# Patient Record
Sex: MALE | Race: AMERICAN INDIAN OR ALASKA NATIVE | Employment: OTHER | URBAN - METROPOLITAN AREA
[De-identification: names, ages, dates, MRNs, and addresses within clinical notes are randomized per-mention and may not be internally consistent; named-entity substitution may affect disease eponyms.]

---

## 2020-01-23 ENCOUNTER — TELEPHONE (OUTPATIENT)
Dept: SURGERY | Facility: CLINIC | Age: 40
End: 2020-01-23

## 2020-01-23 NOTE — TELEPHONE ENCOUNTER
Called and spoke with patient in regards to concerns. Advised patient that we cannot authorize orders since he has not been seen in clinic since 2015 (last with Jerica Bernal). Patient understands and would like to follow up in clinic. Scheduled with Mirtha Alejandra PA-C next Wednesday 1/29/20 at 8:15am.

## 2020-01-23 NOTE — TELEPHONE ENCOUNTER
Cold call, Hunter is calling to schedule an infusion.  He went to [facility] urgent care yesterday as had been experiencing headaches, he had some labs done, reports Hemoglobin result was 8.  He is calling to get infusion, he hasn't needed one for several years.  Best number to contact him at today is

## 2020-01-27 ENCOUNTER — TELEPHONE (OUTPATIENT)
Dept: SURGERY | Facility: CLINIC | Age: 40
End: 2020-01-27

## 2020-01-27 NOTE — TELEPHONE ENCOUNTER
Called to remind patient of appointment, patient had to cancel, will call to reschedule.   Kalani Orta, EMT

## 2020-02-11 ENCOUNTER — TELEPHONE (OUTPATIENT)
Dept: SURGERY | Facility: CLINIC | Age: 40
End: 2020-02-11

## 2020-02-11 NOTE — TELEPHONE ENCOUNTER
Patient had DS weight loss surgery 2009.      Scheduled to see Mirtha Alejandra PA-C at 1015.    Spoke to patient: reguarding appointment on Wednesday February 12th.        Instructed to complete pre-visit questionnaires on [employer], or arrive 20 minutes early to complete.    [phone] contact center phone number.    Kalani Orta, EMT

## 2020-02-12 ENCOUNTER — INFUSION THERAPY VISIT (OUTPATIENT)
Dept: INFUSION THERAPY | Facility: CLINIC | Age: 40
End: 2020-02-12
Payer: COMMERCIAL

## 2020-02-12 ENCOUNTER — OFFICE VISIT (OUTPATIENT)
Dept: SURGERY | Facility: CLINIC | Age: 40
End: 2020-02-12
Payer: COMMERCIAL

## 2020-02-12 VITALS
HEIGHT: 73 IN | DIASTOLIC BLOOD PRESSURE: 78 MMHG | SYSTOLIC BLOOD PRESSURE: 131 MMHG | WEIGHT: 199.6 LBS | BODY MASS INDEX: 26.45 KG/M2 | HEART RATE: 81 BPM | OXYGEN SATURATION: 96 % | TEMPERATURE: 98.4 F

## 2020-02-12 VITALS
TEMPERATURE: 98.4 F | SYSTOLIC BLOOD PRESSURE: 119 MMHG | DIASTOLIC BLOOD PRESSURE: 72 MMHG | HEART RATE: 69 BPM | OXYGEN SATURATION: 96 %

## 2020-02-12 DIAGNOSIS — D50.9 IRON DEFICIENCY ANEMIA, UNSPECIFIED IRON DEFICIENCY ANEMIA TYPE: ICD-10-CM

## 2020-02-12 DIAGNOSIS — Z98.84 BARIATRIC SURGERY STATUS: ICD-10-CM

## 2020-02-12 DIAGNOSIS — D50.9 IRON DEFICIENCY ANEMIA, UNSPECIFIED IRON DEFICIENCY ANEMIA TYPE: Primary | ICD-10-CM

## 2020-02-12 DIAGNOSIS — Z98.84 BARIATRIC SURGERY STATUS: Primary | ICD-10-CM

## 2020-02-12 LAB
ALBUMIN SERPL-MCNC: 3.7 G/DL (ref 3.4–5)
ALP SERPL-CCNC: 73 U/L (ref 40–150)
ALT SERPL W P-5'-P-CCNC: 37 U/L (ref 0–70)
ANION GAP SERPL CALCULATED.3IONS-SCNC: 5 MMOL/L (ref 3–14)
AST SERPL W P-5'-P-CCNC: 18 U/L (ref 0–45)
BILIRUB SERPL-MCNC: 0.3 MG/DL (ref 0.2–1.3)
BUN SERPL-MCNC: 11 MG/DL (ref 7–30)
CALCIUM SERPL-MCNC: 8.3 MG/DL (ref 8.5–10.1)
CHLORIDE SERPL-SCNC: 110 MMOL/L (ref 94–109)
CHOLEST SERPL-MCNC: 115 MG/DL
CO2 SERPL-SCNC: 25 MMOL/L (ref 20–32)
CREAT SERPL-MCNC: 0.59 MG/DL (ref 0.66–1.25)
ERYTHROCYTE [DISTWIDTH] IN BLOOD BY AUTOMATED COUNT: 21.4 % (ref 10–15)
FERRITIN SERPL-MCNC: 3 NG/ML (ref 26–388)
GFR SERPL CREATININE-BSD FRML MDRD: >90 ML/MIN/{1.73_M2}
GLUCOSE SERPL-MCNC: 105 MG/DL (ref 70–99)
HBA1C MFR BLD: NORMAL % (ref 0–5.6)
HCT VFR BLD AUTO: 32.5 % (ref 40–53)
HDLC SERPL-MCNC: 45 MG/DL
HGB BLD-MCNC: 9.4 G/DL (ref 13.3–17.7)
LDLC SERPL CALC-MCNC: 60 MG/DL
MCH RBC QN AUTO: 22.3 PG (ref 26.5–33)
MCHC RBC AUTO-ENTMCNC: 28.9 G/DL (ref 31.5–36.5)
MCV RBC AUTO: 77 FL (ref 78–100)
NONHDLC SERPL-MCNC: 70 MG/DL
PLATELET # BLD AUTO: 315 10E9/L (ref 150–450)
POTASSIUM SERPL-SCNC: 4 MMOL/L (ref 3.4–5.3)
PREALB SERPL IA-MCNC: 23 MG/DL (ref 15–45)
PROT SERPL-MCNC: 7.8 G/DL (ref 6.8–8.8)
PTH-INTACT SERPL-MCNC: 148 PG/ML (ref 18–80)
RBC # BLD AUTO: 4.21 10E12/L (ref 4.4–5.9)
SODIUM SERPL-SCNC: 140 MMOL/L (ref 133–144)
TRIGL SERPL-MCNC: 52 MG/DL
WBC # BLD AUTO: 8.3 10E9/L (ref 4–11)

## 2020-02-12 PROCEDURE — 96365 THER/PROPH/DIAG IV INF INIT: CPT

## 2020-02-12 PROCEDURE — 82728 ASSAY OF FERRITIN: CPT | Performed by: PHYSICIAN ASSISTANT

## 2020-02-12 PROCEDURE — 25000128 H RX IP 250 OP 636: Mod: ZF | Performed by: PHYSICIAN ASSISTANT

## 2020-02-12 PROCEDURE — 84597 ASSAY OF VITAMIN K: CPT | Performed by: PHYSICIAN ASSISTANT

## 2020-02-12 PROCEDURE — 84446 ASSAY OF VITAMIN E: CPT | Performed by: PHYSICIAN ASSISTANT

## 2020-02-12 PROCEDURE — 82525 ASSAY OF COPPER: CPT | Performed by: PHYSICIAN ASSISTANT

## 2020-02-12 PROCEDURE — 25800030 ZZH RX IP 258 OP 636: Mod: ZF | Performed by: PHYSICIAN ASSISTANT

## 2020-02-12 PROCEDURE — 80061 LIPID PANEL: CPT | Performed by: PHYSICIAN ASSISTANT

## 2020-02-12 PROCEDURE — 84134 ASSAY OF PREALBUMIN: CPT | Performed by: PHYSICIAN ASSISTANT

## 2020-02-12 PROCEDURE — 84590 ASSAY OF VITAMIN A: CPT | Performed by: PHYSICIAN ASSISTANT

## 2020-02-12 PROCEDURE — 84630 ASSAY OF ZINC: CPT | Performed by: PHYSICIAN ASSISTANT

## 2020-02-12 PROCEDURE — 36415 COLL VENOUS BLD VENIPUNCTURE: CPT

## 2020-02-12 PROCEDURE — 80053 COMPREHEN METABOLIC PANEL: CPT | Performed by: PHYSICIAN ASSISTANT

## 2020-02-12 PROCEDURE — 83970 ASSAY OF PARATHORMONE: CPT | Performed by: PHYSICIAN ASSISTANT

## 2020-02-12 PROCEDURE — 83036 HEMOGLOBIN GLYCOSYLATED A1C: CPT | Performed by: PHYSICIAN ASSISTANT

## 2020-02-12 PROCEDURE — 85027 COMPLETE CBC AUTOMATED: CPT | Performed by: PHYSICIAN ASSISTANT

## 2020-02-12 PROCEDURE — 82306 VITAMIN D 25 HYDROXY: CPT | Performed by: PHYSICIAN ASSISTANT

## 2020-02-12 RX ORDER — FENTANYL 25 UG/1
PATCH TRANSDERMAL
COMMUNITY
Start: 2020-01-23

## 2020-02-12 RX ORDER — HEPARIN SODIUM,PORCINE 10 UNIT/ML
5 VIAL (ML) INTRAVENOUS
Status: CANCELLED | OUTPATIENT
Start: 2020-02-19

## 2020-02-12 RX ORDER — HEPARIN SODIUM (PORCINE) LOCK FLUSH IV SOLN 100 UNIT/ML 100 UNIT/ML
5 SOLUTION INTRAVENOUS
Status: CANCELLED | OUTPATIENT
Start: 2020-02-19

## 2020-02-12 RX ORDER — AMITRIPTYLINE HYDROCHLORIDE 150 MG/1
TABLET ORAL
COMMUNITY
Start: 2019-03-19

## 2020-02-12 RX ORDER — HEPARIN SODIUM,PORCINE 10 UNIT/ML
5 VIAL (ML) INTRAVENOUS
Status: CANCELLED | OUTPATIENT
Start: 2020-02-12

## 2020-02-12 RX ORDER — HEPARIN SODIUM (PORCINE) LOCK FLUSH IV SOLN 100 UNIT/ML 100 UNIT/ML
5 SOLUTION INTRAVENOUS
Status: CANCELLED | OUTPATIENT
Start: 2020-02-12

## 2020-02-12 RX ORDER — AMITRIPTYLINE HYDROCHLORIDE 50 MG/1
TABLET ORAL
COMMUNITY
Start: 2020-01-28

## 2020-02-12 RX ADMIN — FERRIC CARBOXYMALTOSE INJECTION 750 MG: 50 INJECTION, SOLUTION INTRAVENOUS at 16:14

## 2020-02-12 ASSESSMENT — PAIN SCALES - GENERAL: PAINLEVEL: SEVERE PAIN (7)

## 2020-02-12 ASSESSMENT — MIFFLIN-ST. JEOR: SCORE: 1869.26

## 2020-02-12 NOTE — PATIENT INSTRUCTIONS
Patient Education   Dear Hunter Yusuf    Thank you for choosing [facility] ([facility]) for your infusion.  The following information is a summary of our appointment as well as important reminders.        We look forward in seeing you on your next appointment here at [facility] ([facility]).  Please don t hesitate to call us at [phone] to reschedule any of your appointments or to speak with one of the [facility] registered nurses.  It was a pleasure taking care of you today.    Sincerely,    [facility]  [facility]  [address] [place] [address]  [address]  Phone:  [phone]  Ferric carboxymaltose Solution for injection  What is this medicine?  FERRIC CARBOXYMALTOSE (ferr-ik car-box-ee-mol-toes) is an iron complex. Iron is used to make healthy red blood cells, which carry oxygen and nutrients throughout the body. This medicine is used to treat anemia in people with chronic kidney disease or people who cannot take iron by mouth.  This medicine may be used for other purposes; ask your health care provider or pharmacist if you have questions.  What should I tell my health care provider before I take this medicine?  They need to know if you have any of these conditions:    anemia not caused by low iron levels    high levels of iron in the blood    liver disease    an unusual or allergic reaction to iron, other medicines, foods, dyes, or preservatives    pregnant or trying to get pregnant    breast-feeding  How should I use this medicine?  This medicine is for infusion into a vein. It is given by a health care professional in a hospital or clinic setting.  Talk to your pediatrician regarding the use of this medicine in children. Special care may be needed.  Overdosage: If you think you've taken too much of this medicine contact a poison control center or emergency room at  once.  NOTE: This medicine is only for you. Do not share this medicine with others.  What if I miss a dose?  It is important not to miss your dose. Call your doctor or health care professional if you are unable to keep an appointment.  What may interact with this medicine?  Do not take this medicine with any of the following medications:  deferoxamine  dimercaprol  other iron products  This medicine may also interact with the following medications:  chloramphenicol  deferasirox  This list may not describe all possible interactions. Give your health care provider a list of all the medicines, herbs, non-prescription drugs, or dietary supplements you use. Also tell them if you smoke, drink alcohol, or use illegal drugs. Some items may interact with your medicine.  What should I watch for while using this medicine?  Visit your doctor or health care professional regularly. Tell your doctor if your symptoms do not start to get better or if they get worse. You may need blood work done while you are taking this medicine.  You may need to follow a special diet. Talk to your doctor. Foods that contain iron include: whole grains/cereals, dried fruits, beans, or peas, leafy green vegetables, and organ meats (liver, kidney).  What side effects may I notice from receiving this medicine?  Side effects that you should report to your doctor or health care professional as soon as possible:  allergic reactions like skin rash, itching or hives, swelling of the face, lips, or tonguebreathing problems  changes in blood pressure  feeling faint or lightheaded, falls  flushing, sweating, or hot feelings  Side effects that usually do not require medical attention (Report these to your doctor or health care professional if they continue or are bothersome.):  changes in taste  constipation  dizziness  headache  nausea  pain, redness, or irritation at site where injected  vomiting  This list may not describe all possible side effects. Call your  doctor for medical advice about side effects. You may report side effects to FDA at 3-700-CSE-5902.  Where should I keep my medicine?  This drug is given in a hospital or clinic and will not be stored at home.  NOTE: This sheet is a summary. It may not cover all possible information. If you have questions about this medicine, talk to your doctor, pharmacist, or health care provider.  NOTE:This sheet is a summary. It may not cover all possible information. If you have questions about this medicine, talk to your doctor, pharmacist, or health care provider. Copyright  2016 Gold Standard

## 2020-02-12 NOTE — PROGRESS NOTES
"New Re-establish Bariatric Surgery Consultation Note    2020    RE: Hunter Yusuf  MR#: [id number]  : 1980      Referring provider:       2020   Who referred you? Marianne       Chief Complaint/Reason for visit: Re-establish bariatric care    Dear Clinic, Allyn Deleon (General),    I had the pleasure of seeing your patient, Hunter Yusuf, to re-establish bariatric care. As you know, Hunter Yusuf is 39 year old.  He has a height of 6' 1\", a weight of 199 lbs 9.6 oz, and calculated Body mass index is 26.33 kg/m .    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 HgB 11.6 in  with ferritin 4.  Smoke 1/2 ppd cigarettes  No alcohol use  No abdominal pain  No issues with eating  Eats > grams protein daily  He works full time as manager of [employer]      HISTORY OF PRESENT ILLNESS:  Weight Loss History Reviewed with Patient 2020   How long have you been overweight? Since early childhood   What is the most that you have ever weighed? 502   What is the most weight you have lost? 300   I have tried the following methods to lose weight Weight Loss Surgery   I have tried the following weight loss medications? (Check all that apply) None   Have you ever had weight loss surgery? Yes   Please select the type of weight loss surgery you had (select all that apply): duodenal switch       CO-MORBIDITIES OF OBESITY INCLUDE:     2020   I have the following health issues associated with obesity: None of the " above       PAST MEDICAL HISTORY:  Past Medical History:   Diagnosis Date     Arthritis      Migraines      Uncomplicated asthma        PAST SURGICAL HISTORY:  Past Surgical History:   Procedure Laterality Date     BYPASS GASTRIC DUODENAL SWITCH       ORTHOPEDIC SURGERY         FAMILY HISTORY:   No family history on file.    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 Yes   Who do you have in your support network that can be available to help you for the first 2 weeks after surgery? girlfriend   Who can you count on for support throughout your weight loss surgery journey? girlfriend   Can you afford 3 meals a day?  Yes   Can you afford 50-60 dollars a month for vitamins? Yes       HABITS:     2/12/2020   How often do you drink alcohol? Never   Do you currently use any of the following Nicotine products? cigarettes   Have you ever used any of the following nicotine products? Cigarettes   Have you or are you currently using street drugs or prescription strength medication for which you do not have a prescription for? No   Do you have a history of chemical dependency (alcohol or drug abuse)? No       PSYCHOLOGICAL HISTORY:   Psychological History Reviewed With Patient 2/12/2020   Have you ever attempted suicide? Never.   Have you had thoughts of suicide in the past year? No   Have you ever been hospitalized for mental illness or a suicide attempt? Never.   Do you have a history of chronic pain? Yes   Have you ever been diagnosed with fibromyalgia? No   Are you currently seeing a therapist or counselor?  No   Are you currently seeing a psychiatrist? No       ROS:     2/12/2020   Skin:  Skin fold rashes (groin or other folds)   HEENT: Headaches, Missing teeth    If you answered yes to missing teeth, please indicate how many: 10   Musculoskeletal: Joint Pain, Back pain, Limited mobility, Arthritis   Cardiovascular: Shortness of breath with activity, None of the above   Pulmonary: Shortness of breath with activity   Gastrointestinal: None of the above   Genitourinary: None of the above   Hematological: None of the above   Neurological: Migraine headaches       EATING BEHAVIORS:     2/12/2020   Have you or anyone else thought that you had an eating disorder? No   Do you currently binge eat (eat a large amount of food in a short time)? No   Are you an emotional eater? No   Do you get up to eat after falling asleep? No       EXERCISE:     2/12/2020   How often do you exercise? Less than 1 time per week   What is the duration of your exercise (in minutes)? I do not exercise.   What types of exercise do you do? I don't exercise   What keeps you from being more active?  I am as active as I can possbily be       MEDICATIONS:  Current Outpatient Medications   Medication Sig Dispense Refill     Albuterol Sulfate (PROAIR HFA IN) Inhale  into the lungs.       amitriptyline (ELAVIL) 50 MG tablet        fentaNYL (DURAGESIC) 25 mcg/hr 72 hr patch        HYDROcodone-acetaminophen  MG per tablet Take 1 tablet by mouth every 6 hours as needed.       tiZANidine (ZANAFLEX) 4 MG tablet        vitamin A 53221 UNIT capsule Take 50,000 Units by mouth daily.       HYDROXYZINE HCL PO Take 10 mg by mouth 2 times daily         ALLERGIES:  Allergies   Allergen Reactions     Imitrex [Sumatriptan]      Vioxx        LABS/IMAGING/MEDICAL RECORDS REVIEW:     Care Everywhere labs reviewed 1/22/20  B12 normal 691  Liver panel normal Albumin 3.9 normal  BMP: normal except calcium 8.1  Iron 19 ()  Iron binding capacity 403 (245-400)  Iron sat 5 (20-55)  CBC: Hemoglobin 8.7, MCV 75 MCH 22      PHYSICAL EXAM:  /78 (BP Location: Left arm, Patient Position: Sitting, Cuff Size: Adult Large)    "Pulse 81   Temp 98.4  F (36.9  C) (Oral)   Ht 1.854 m (6' 1\")   Wt 90.5 kg (199 lb 9.6 oz)   SpO2 96%   BMI 26.33 kg/m    General: NAD  Neurologic: A & O x 3, gait normal  Head: normocephalic, atraumatic  HEENT: PERRL, EOMI.   Respiratory: respirations unlabored  Abdomen: Obese, Soft NT ND   Extremities: No LE swelling   Skin: warm and dry.  No rashes on exposed skin  Psychiatric: Mentation and Affect appear normal      In summary, Hunter Yusuf s/p duodenal switch in 2007 with Dr Lopes with 300 lbs weight loss. Here to re-establish care and to evaluate bariatric labs and iron deficiency anemia and headaches that may be associated with anemia.  BMI 26 currently.     Plan:  Iron infusion ordered and [facility] be [facility] today [facility]  Bariatric labs ordered today, will call with results, does not sure Transmit  [email or web address] to order duodenal switch multivitamin  If headaches don't improve go to neurologist, let us know if you need referral  Follow up in 1-2 months with Mirtha Alejandra return bariatric surgery        Sincerely,     Mirtha Alejandra, PA-C    I spent a total of 30 minutes face to face with the patient during today's office visit. Over 50% of this time was spent counseling the patient and/or coordinating care.  "

## 2020-02-12 NOTE — PATIENT INSTRUCTIONS
Iron infusion today for iron deficiency anemia    Bariatric labs today    [email or web address] to order duodenal switch multivitamin    If headaches don't improve go to neurologist, let us know if you need referral    Follow up in 1-2 months with Mirtha Alejandra return bariatric surgery      For any questions or concerns call Nata Pagan LPN at [phone]

## 2020-02-12 NOTE — LETTER
"2020       RE: Hunter Yusuf  [address]  [place] [address]     Dear Colleague,    Thank you for referring your patient, Hunter Yusuf, to the [facility] at [facility]. Please see a copy of my visit note below.    New Re-establish Bariatric Surgery Consultation Note    2020    RE: Hunter Yusuf  MR#: [id number]  : 1980      Referring provider:       2020   Who referred you? Marianne       Chief Complaint/Reason for visit: Re-establish bariatric care    Dear Clinic, Allyn Deleon (General),    I had the pleasure of seeing your patient, Hunter Yusuf, to re-establish bariatric care. As you know, Hunter Yusuf is 39 year old.  He has a height of 6' 1\", a weight of 199 lbs 9.6 oz, and calculated Body mass index is 26.33 kg/m .    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 HgB 11.6 in  with ferritin 4.  Smoke 1/2 ppd cigarettes  No alcohol use  No abdominal pain  No issues with eating  Eats > grams protein daily  He works full time as manager of [employer]      HISTORY OF PRESENT ILLNESS:  Weight Loss History Reviewed with Patient 2020   How long have you been overweight? Since early childhood   What is the most that you have ever weighed? 502   What is the most weight you have lost? 300   I have tried the following methods to lose weight Weight Loss Surgery   I have tried the following weight loss medications? " (Check all that apply) None   Have you ever had weight loss surgery? Yes   Please select the type of weight loss surgery you had (select all that apply): duodenal switch       CO-MORBIDITIES OF OBESITY INCLUDE:     2/12/2020   I have the following health issues associated with obesity: None of the above       PAST MEDICAL HISTORY:  Past Medical History:   Diagnosis Date     Arthritis      Migraines      Uncomplicated asthma        PAST SURGICAL HISTORY:  Past Surgical History:   Procedure Laterality Date     BYPASS GASTRIC DUODENAL SWITCH       ORTHOPEDIC SURGERY         FAMILY HISTORY:   No family history on file.    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 Yes   Who do you have in your support network that can be available to help you for the first 2 weeks after surgery? girlfriend   Who can you count on for support throughout your weight loss surgery journey? girlfriend   Can you afford 3 meals a day?  Yes   Can you afford 50-60 dollars a month for vitamins? Yes       HABITS:     2/12/2020   How often do you drink alcohol? Never   Do you currently use any of the following Nicotine products? cigarettes   Have you ever used any of the following nicotine products? Cigarettes   Have you or are you currently using street drugs or prescription strength medication for which you do not have a prescription for? No   Do you have a history of chemical dependency (alcohol or drug abuse)? No       PSYCHOLOGICAL HISTORY:   Psychological History Reviewed With Patient 2/12/2020   Have you ever attempted suicide? Never.   Have you had thoughts of suicide in the past year? No   Have you ever been hospitalized for mental illness or a suicide attempt? Never.   Do you have  a history of chronic pain? Yes   Have you ever been diagnosed with fibromyalgia? No   Are you currently seeing a therapist or counselor?  No   Are you currently seeing a psychiatrist? No       ROS:     2/12/2020   Skin:  Skin fold rashes (groin or other folds)   HEENT: Headaches, Missing teeth   If you answered yes to missing teeth, please indicate how many: 10   Musculoskeletal: Joint Pain, Back pain, Limited mobility, Arthritis   Cardiovascular: Shortness of breath with activity, None of the above   Pulmonary: Shortness of breath with activity   Gastrointestinal: None of the above   Genitourinary: None of the above   Hematological: None of the above   Neurological: Migraine headaches       EATING BEHAVIORS:     2/12/2020   Have you or anyone else thought that you had an eating disorder? No   Do you currently binge eat (eat a large amount of food in a short time)? No   Are you an emotional eater? No   Do you get up to eat after falling asleep? No       EXERCISE:     2/12/2020   How often do you exercise? Less than 1 time per week   What is the duration of your exercise (in minutes)? I do not exercise.   What types of exercise do you do? I don't exercise   What keeps you from being more active?  I am as active as I can possbily be       MEDICATIONS:  Current Outpatient Medications   Medication Sig Dispense Refill     Albuterol Sulfate (PROAIR HFA IN) Inhale  into the lungs.       amitriptyline (ELAVIL) 50 MG tablet        fentaNYL (DURAGESIC) 25 mcg/hr 72 hr patch        HYDROcodone-acetaminophen  MG per tablet Take 1 tablet by mouth every 6 hours as needed.       tiZANidine (ZANAFLEX) 4 MG tablet        vitamin A 53076 UNIT capsule Take 50,000 Units by mouth daily.       HYDROXYZINE HCL PO Take 10 mg by mouth 2 times daily         ALLERGIES:  Allergies   Allergen Reactions     Imitrex [Sumatriptan]      Vioxx        LABS/IMAGING/MEDICAL RECORDS REVIEW:     Care Everywhere labs reviewed 1/22/20  B12 normal  "691  Liver panel normal Albumin 3.9 normal  BMP: normal except calcium 8.1  Iron 19 ()  Iron binding capacity 403 (245-400)  Iron sat 5 (20-55)  CBC: Hemoglobin 8.7, MCV 75 MCH 22      PHYSICAL EXAM:  /78 (BP Location: Left arm, Patient Position: Sitting, Cuff Size: Adult Large)   Pulse 81   Temp 98.4  F (36.9  C) (Oral)   Ht 1.854 m (6' 1\")   Wt 90.5 kg (199 lb 9.6 oz)   SpO2 96%   BMI 26.33 kg/m     General: NAD  Neurologic: A & O x 3, gait normal  Head: normocephalic, atraumatic  HEENT: PERRL, EOMI.   Respiratory: respirations unlabored  Abdomen: Obese, Soft NT ND   Extremities: No LE swelling   Skin: warm and dry.  No rashes on exposed skin  Psychiatric: Mentation and Affect appear normal      In summary, Hunter Yusuf s/p duodenal switch in 2007 with Dr Lopes with 300 lbs weight loss. Here to re-establish care and to evaluate bariatric labs and iron deficiency anemia and headaches that may be associated with anemia.  BMI 26 currently.     Plan:  Iron infusion ordered and to be done today at the [facility]  Bariatric labs ordered today, will call with results, does not sure [employer]  [email or web address] to order duodenal switch multivitamin  If headaches don't improve go to neurologist, let us know if you need referral  Follow up in 1-2 months with Mirtha Alejandra return bariatric surgery        Sincerely,     Mirtha Alejandra PA-C    I spent a total of 30 minutes face to face with the patient during today's office visit. Over 50% of this time was spent counseling the patient and/or coordinating care.      "

## 2020-02-12 NOTE — NURSING NOTE
"(   Chief Complaint   Patient presents with     Consult     New re-establish care.    )    ( Weight: 90.5 kg (199 lb 9.6 oz) )  ( Height: 185.4 cm (6' 1\") )  ( BMI (Calculated): 26.33 )  (   )  ( Cumulative weight loss (lbs): 302.4 )  ( Last Visits Weight: 89.2 kg (196 lb 9.6 oz) )  ( Wt change since last visit (lbs): 3 )  ( Waist Circumference (cm): 104 cm )  (   )    ( BP: 131/78 )  (   )  ( Temp: 98.4  F (36.9  C) )  ( Temp src: Oral )  ( Pulse: 81 )  (   )  ( SpO2: 96 % )    (   Patient Active Problem List   Diagnosis     Vitamin A deficiency with night blindness     Bariatric surgery status     Intestinal malabsorption     Mineral deficiency     Vitamin D deficiency     Vitamin E deficiency     Anemia    )  (   Current Outpatient Medications   Medication Sig Dispense Refill     Albuterol Sulfate (PROAIR HFA IN) Inhale  into the lungs.       amitriptyline (ELAVIL) 50 MG tablet        fentaNYL (DURAGESIC) 25 mcg/hr 72 hr patch        HYDROcodone-acetaminophen  MG per tablet Take 1 tablet by mouth every 6 hours as needed.       tiZANidine (ZANAFLEX) 4 MG tablet        vitamin A 68105 UNIT capsule Take 50,000 Units by mouth daily.       HYDROXYZINE HCL PO Take 10 mg by mouth 2 times daily      )  ( Diabetes Eval:    )    ( Pain Eval:  Severe Pain (7) )    ( Wound Eval:       )    (   History   Smoking Status     Current Every Day Smoker     Packs/day: 0.50   Smokeless Tobacco     Never Used    )    ( Signed By:  Radha Shahid CMA; February 12, 2020; 2:24 PM )    "

## 2020-02-12 NOTE — PROGRESS NOTES
Nursing Note  Hunter Yusuf presents today to [facility] for:   Chief Complaint   Patient presents with     Infusion     injectafer     During today's [facility] appointment, orders from Mirtha Alejandra PA-C were completed.  Frequency: today is dose 1 of 2 total    Progress note:  Patient identification verified by name and date of birth.  Assessment completed.  Vitals recorded in Doc Flowsheets.  Patient was provided with education regarding medication/procedure and possible side effects.  Patient verbalized understanding.     present during visit today: Not Applicable.    Treatment Conditions: Patient denies fever, chills, signs of infection, recent illness, antibiotics use, productive cough or elevated temperature.  Patient monitored for 30 minutes after medication was administered.    Premedications: were not ordered.    Drug Waste Record: No    Infusion length and rate:  infusion given over approximately 15 minutes    Labs: were not ordered for this appointment.    Vascular access: peripheral IV placed today.    Post Infusion Assessment:  Patient tolerated infusion without incident.     Discharge Plan:   Follow up plan of care with: ongoing infusions at [facility].  Discharge instructions were reviewed with patient.  Patient/representative verbalized understanding of discharge instructions and all questions answered.  Patient discharged from [facility] in stable condition.    Audelia Vanegas RN    Administrations This Visit     ferric carboxymaltose (INJECTAFER) 750 mg in sodium chloride 0.9 % 100 mL intermittent infusion     Admin Date  02/12/2020 Action  New Bag Dose  750 mg Route  Intravenous Administered By  Audelia Vanegas RN                /78   Pulse 81   Temp 98.4  F (36.9  C) (Oral)   SpO2 96%

## 2020-02-13 LAB — DEPRECATED CALCIDIOL+CALCIFEROL SERPL-MC: 4 UG/L (ref 20–75)

## 2020-02-14 DIAGNOSIS — E55.9 VITAMIN D DEFICIENCY: Primary | ICD-10-CM

## 2020-02-14 DIAGNOSIS — Z98.84 BARIATRIC SURGERY STATUS: ICD-10-CM

## 2020-02-14 LAB — COPPER SERPL-MCNC: 75.5 UG/DL (ref 70–140)

## 2020-02-15 LAB
A-TOCOPHEROL VIT E SERPL-MCNC: 2.1 MG/L (ref 5.5–18)
ANNOTATION COMMENT IMP: ABNORMAL
BETA+GAMMA TOCOPHEROL SERPL-MCNC: <0.2 MG/L (ref 0–6)
PHYTONADIONE SERPL-MCNC: <0.1 NMOL/L (ref 0.22–4.88)
RETINYL PALMITATE SERPL-MCNC: <0.02 MG/L (ref 0–0.1)
VIT A SERPL-MCNC: 0.06 MG/L (ref 0.3–1.2)
VIT B1 BLD-MCNC: 187 NMOL/L (ref 70–180)
ZINC SERPL-MCNC: 47.4 UG/DL (ref 60–120)

## 2020-02-17 ENCOUNTER — TELEPHONE (OUTPATIENT)
Dept: SURGERY | Facility: CLINIC | Age: 40
End: 2020-02-17

## 2020-02-17 DIAGNOSIS — E50.9 VITAMIN A DEFICIENCY: Primary | ICD-10-CM

## 2020-02-17 NOTE — TELEPHONE ENCOUNTER
Call from patient with questions in regards to bariatric vitamins. Patient states that bariatricAdek vitamins are on back order until end of March. Per Mirtha Alejandra, PC-C, patient should order any bariatric multivitamin with iron and order Adek when available. Patient understands.

## 2020-03-02 DIAGNOSIS — E50.9 VITAMIN A DEFICIENCY: Primary | ICD-10-CM

## 2020-03-03 ENCOUNTER — TELEPHONE (OUTPATIENT)
Dept: SURGERY | Facility: CLINIC | Age: 40
End: 2020-03-03

## 2020-03-03 NOTE — TELEPHONE ENCOUNTER
Called and spoke with patient in regards to infusion and lab follow up. Patient states he did have both iron infusions and vitamin A injection. Patient is currently taking Bariatric Advantage Ultra vitamins (waiting for Adek to come). Advised patient that Mirtha Alejandra PA-C placed orders to recheck labs (vitamin A and D). Patient states he will get labs done at  in [place] in the next week.

## 2020-12-02 ENCOUNTER — TELEPHONE (OUTPATIENT)
Dept: SURGERY | Facility: CLINIC | Age: 40
End: 2020-12-02

## 2020-12-02 NOTE — TELEPHONE ENCOUNTER
Reason for call:  Order, Patient calling because he wants to come in and get lab work done.   Order or referral being requested: lab orders  Reason for request: Patient calling and wanting to get labs done.  Date needed: as soon as possible  Has the patient been seen by the PCP for this problem? Not Applicable    Additional comments: none    Phone number to reach patient:  Home number on file [phone] (home)    Best Time:  anytime    Can we leave a detailed message on this number?  YES    Travel screening: Not Applicable

## 2021-02-26 ENCOUNTER — TELEPHONE (OUTPATIENT)
Dept: ENDOCRINOLOGY | Facility: CLINIC | Age: 41
End: 2021-02-26

## 2021-02-26 DIAGNOSIS — E56.0 VITAMIN E DEFICIENCY: ICD-10-CM

## 2021-02-26 DIAGNOSIS — D50.9 IRON DEFICIENCY ANEMIA: ICD-10-CM

## 2021-02-26 DIAGNOSIS — E50.5 VITAMIN A DEFICIENCY WITH NIGHT BLINDNESS: ICD-10-CM

## 2021-02-26 DIAGNOSIS — Z98.84 BARIATRIC SURGERY STATUS: Primary | ICD-10-CM

## 2021-02-26 DIAGNOSIS — E55.9 VITAMIN D DEFICIENCY: ICD-10-CM

## 2021-02-26 DIAGNOSIS — E61.8 MINERAL DEFICIENCY: ICD-10-CM

## 2021-02-26 NOTE — TELEPHONE ENCOUNTER
Called and spoke with patient in regards to labs. Advised patient that lab orders will be faxed to [facility] in [place] by in clinic staff. Offered patient appointment with Mirtha for follow up to discuss results. Patient's last visit was 2/2020 and was instructed to follow up in 1-2 months. Patient had iron infusions and did not follow up. Patient is scheduled with Mirtha for telephone call on Wednesday 3/10. Standard bariatric labs ordered.

## 2021-02-26 NOTE — TELEPHONE ENCOUNTER
Pt asked to talk to nurse, to have labs ordered and sent to [facility]    [phone]  **OK to leave detailed VM

## 2021-03-02 DIAGNOSIS — E61.8 MINERAL DEFICIENCY: ICD-10-CM

## 2021-03-02 DIAGNOSIS — E50.5 VITAMIN A DEFICIENCY WITH NIGHT BLINDNESS: ICD-10-CM

## 2021-03-02 DIAGNOSIS — E56.0 VITAMIN E DEFICIENCY: ICD-10-CM

## 2021-03-02 DIAGNOSIS — Z98.84 BARIATRIC SURGERY STATUS: ICD-10-CM

## 2021-03-02 DIAGNOSIS — E55.9 VITAMIN D DEFICIENCY: ICD-10-CM

## 2021-03-02 DIAGNOSIS — D50.9 IRON DEFICIENCY ANEMIA: ICD-10-CM

## 2021-03-02 LAB
ALBUMIN SERPL-MCNC: 3.7 G/DL (ref 3.4–5)
ALP SERPL-CCNC: 69 U/L (ref 40–150)
ALT SERPL W P-5'-P-CCNC: 42 U/L (ref 0–70)
ANION GAP SERPL CALCULATED.3IONS-SCNC: 1 MMOL/L (ref 3–14)
AST SERPL W P-5'-P-CCNC: 21 U/L (ref 0–45)
BILIRUB SERPL-MCNC: 0.3 MG/DL (ref 0.2–1.3)
BUN SERPL-MCNC: 13 MG/DL (ref 7–30)
CALCIUM SERPL-MCNC: 8.4 MG/DL (ref 8.5–10.1)
CHLORIDE SERPL-SCNC: 111 MMOL/L (ref 94–109)
CO2 SERPL-SCNC: 29 MMOL/L (ref 20–32)
CREAT SERPL-MCNC: 0.74 MG/DL (ref 0.66–1.25)
ERYTHROCYTE [DISTWIDTH] IN BLOOD BY AUTOMATED COUNT: 13.8 % (ref 10–15)
FERRITIN SERPL-MCNC: 17 NG/ML (ref 26–388)
GFR SERPL CREATININE-BSD FRML MDRD: >90 ML/MIN/{1.73_M2}
GLUCOSE SERPL-MCNC: 97 MG/DL (ref 70–99)
HBA1C MFR BLD: 4.4 % (ref 0–5.6)
HCT VFR BLD AUTO: 38.4 % (ref 40–53)
HGB BLD-MCNC: 12.3 G/DL (ref 13.3–17.7)
MCH RBC QN AUTO: 31.5 PG (ref 26.5–33)
MCHC RBC AUTO-ENTMCNC: 32 G/DL (ref 31.5–36.5)
MCV RBC AUTO: 98 FL (ref 78–100)
PLATELET # BLD AUTO: 270 10E9/L (ref 150–450)
POTASSIUM SERPL-SCNC: 4.1 MMOL/L (ref 3.4–5.3)
PROT SERPL-MCNC: 7.3 G/DL (ref 6.8–8.8)
PTH-INTACT SERPL-MCNC: 129 PG/ML (ref 18–80)
RBC # BLD AUTO: 3.91 10E12/L (ref 4.4–5.9)
SODIUM SERPL-SCNC: 141 MMOL/L (ref 133–144)
VIT B12 SERPL-MCNC: 1771 PG/ML (ref 193–986)
WBC # BLD AUTO: 5.6 10E9/L (ref 4–11)

## 2021-03-02 PROCEDURE — 83970 ASSAY OF PARATHORMONE: CPT | Performed by: PHYSICIAN ASSISTANT

## 2021-03-02 PROCEDURE — 83036 HEMOGLOBIN GLYCOSYLATED A1C: CPT | Performed by: PHYSICIAN ASSISTANT

## 2021-03-02 PROCEDURE — 99000 SPECIMEN HANDLING OFFICE-LAB: CPT | Performed by: PHYSICIAN ASSISTANT

## 2021-03-02 PROCEDURE — 80053 COMPREHEN METABOLIC PANEL: CPT | Performed by: PHYSICIAN ASSISTANT

## 2021-03-02 PROCEDURE — 84590 ASSAY OF VITAMIN A: CPT | Mod: 90 | Performed by: PHYSICIAN ASSISTANT

## 2021-03-02 PROCEDURE — 82306 VITAMIN D 25 HYDROXY: CPT | Performed by: PHYSICIAN ASSISTANT

## 2021-03-02 PROCEDURE — 85027 COMPLETE CBC AUTOMATED: CPT | Performed by: PHYSICIAN ASSISTANT

## 2021-03-02 PROCEDURE — 82525 ASSAY OF COPPER: CPT | Mod: 90 | Performed by: PHYSICIAN ASSISTANT

## 2021-03-02 PROCEDURE — 84597 ASSAY OF VITAMIN K: CPT | Mod: 90 | Performed by: PHYSICIAN ASSISTANT

## 2021-03-02 PROCEDURE — 82607 VITAMIN B-12: CPT | Performed by: PHYSICIAN ASSISTANT

## 2021-03-02 PROCEDURE — 82728 ASSAY OF FERRITIN: CPT | Performed by: PHYSICIAN ASSISTANT

## 2021-03-02 PROCEDURE — 84134 ASSAY OF PREALBUMIN: CPT | Performed by: PHYSICIAN ASSISTANT

## 2021-03-02 PROCEDURE — 84446 ASSAY OF VITAMIN E: CPT | Mod: 90 | Performed by: PHYSICIAN ASSISTANT

## 2021-03-02 PROCEDURE — 36415 COLL VENOUS BLD VENIPUNCTURE: CPT | Performed by: PHYSICIAN ASSISTANT

## 2021-03-02 PROCEDURE — 84630 ASSAY OF ZINC: CPT | Mod: 90 | Performed by: PHYSICIAN ASSISTANT

## 2021-03-03 ENCOUNTER — TELEPHONE (OUTPATIENT)
Dept: ENDOCRINOLOGY | Facility: CLINIC | Age: 41
End: 2021-03-03

## 2021-03-03 ENCOUNTER — TELEPHONE (OUTPATIENT)
Dept: SURGERY | Facility: CLINIC | Age: 41
End: 2021-03-03

## 2021-03-03 DIAGNOSIS — Z98.84 BARIATRIC SURGERY STATUS: Primary | ICD-10-CM

## 2021-03-03 LAB
DEPRECATED CALCIDIOL+CALCIFEROL SERPL-MC: 4 UG/L (ref 20–75)
PREALB SERPL IA-MCNC: 21 MG/DL (ref 15–45)

## 2021-03-03 RX ORDER — HEPARIN SODIUM,PORCINE 10 UNIT/ML
5 VIAL (ML) INTRAVENOUS
Status: CANCELLED | OUTPATIENT
Start: 2021-03-03

## 2021-03-03 RX ORDER — HEPARIN SODIUM (PORCINE) LOCK FLUSH IV SOLN 100 UNIT/ML 100 UNIT/ML
5 SOLUTION INTRAVENOUS
Status: CANCELLED | OUTPATIENT
Start: 2021-03-03

## 2021-03-03 NOTE — TELEPHONE ENCOUNTER
Pt thought his Mirtha appt was today.  He'd still like someone to call him today to discuss labs.  [phone]  **OK to leaved detailed VM

## 2021-03-03 NOTE — TELEPHONE ENCOUNTER
Called and left message for patientr in regards to infusion orders. Advised patient that orders are being faxed to [facility] (fax # [phone]) and patient should call [phone] to schedule at that location. Gave contact center phone number if patient has further questions.

## 2021-03-03 NOTE — TELEPHONE ENCOUNTER
Patient was called by Mirtha Alejandra PA-C. Infusion orders placed. Patient to follow up in 2 months.

## 2021-03-04 ENCOUNTER — TELEPHONE (OUTPATIENT)
Dept: ENDOCRINOLOGY | Facility: CLINIC | Age: 41
End: 2021-03-04

## 2021-03-04 LAB
COPPER SERPL-MCNC: 94.8 UG/DL (ref 70–140)
ZINC SERPL-MCNC: 42.3 UG/DL (ref 60–120)

## 2021-03-04 NOTE — TELEPHONE ENCOUNTER
Patient called and left message trying to schedule iron infusions. Called back and spoke to patient. Advised that phone number to schedule with [facility] was left on voicemail that was left yesterday. Patient states he did not listen to voicemail. Gave scheduling number [phone]. Patient had no further questions.

## 2021-03-04 NOTE — TELEPHONE ENCOUNTER
Patient called and states [facility] has not received the iron infusion orders yet. Message to clinic staff to refax.

## 2021-03-05 LAB
A-TOCOPHEROL VIT E SERPL-MCNC: 2.3 MG/L (ref 5.5–18)
ANNOTATION COMMENT IMP: NORMAL
BETA+GAMMA TOCOPHEROL SERPL-MCNC: 0.2 MG/L (ref 0–6)
PHYTONADIONE SERPL-MCNC: 0.16 NMOL/L (ref 0.22–4.88)
RETINYL PALMITATE SERPL-MCNC: <0.02 MG/L (ref 0–0.1)
VIT A SERPL-MCNC: 0.35 MG/L (ref 0.3–1.2)

## 2021-03-12 ENCOUNTER — TELEPHONE (OUTPATIENT)
Dept: ENDOCRINOLOGY | Facility: CLINIC | Age: 41
End: 2021-03-12

## 2021-05-05 ENCOUNTER — VIRTUAL VISIT (OUTPATIENT)
Dept: ENDOCRINOLOGY | Facility: CLINIC | Age: 41
End: 2021-05-05
Payer: COMMERCIAL

## 2021-05-05 VITALS — BODY MASS INDEX: 25.18 KG/M2 | HEIGHT: 73 IN | WEIGHT: 190 LBS

## 2021-05-05 DIAGNOSIS — Z98.84 S/P BILIOPANCREATIC DIVERSION WITH DUODENAL SWITCH: ICD-10-CM

## 2021-05-05 PROCEDURE — 99212 OFFICE O/P EST SF 10 MIN: CPT | Mod: 95 | Performed by: PHYSICIAN ASSISTANT

## 2021-05-05 ASSESSMENT — PAIN SCALES - GENERAL: PAINLEVEL: EXTREME PAIN (8)

## 2021-05-05 ASSESSMENT — MIFFLIN-ST. JEOR: SCORE: 1820.71

## 2021-05-05 NOTE — ASSESSMENT & PLAN NOTE
S/p DS with low vit A and vit D and iron deficiency    ADEK Bariatric Advantage two once daily  Vit D3 and A [employer] 3000 international unit(s) of each, Taking 5 per day.   No iron orally  Not taking calcium citrate  Did 2 iron infusions in March   Needed repeat labs  BM 2-3 day since surgery  No swelling in extremities  Smoking 1/2 ppd  No vision changes since taking dry vitamin A    Highest wt: 500 lbs  Weight today: 185-190 lbs which is about 10 lbs less than beatrice weight   He reports eating all the time  Not interested in limb lengthening  Eating > grams protein daily    PLAN:  Recheck labs at any [facility] lab to see how iron infusion and restart of fat soluble vitamins have improved vitamin levels. To find a lab location near you, please call [phone].  Follow up 3-6 months or earlier if continuing to lose weight see Dr Lopes

## 2021-05-05 NOTE — NURSING NOTE
"Chief Complaint   Patient presents with     Follow Up     return bariatric       Vitals:    05/05/21 1049   Weight: 86.2 kg (190 lb)   Height: 1.854 m (6' 1\")       Body mass index is 25.07 kg/m .                            "

## 2021-05-05 NOTE — LETTER
2021       RE: Hunter Yusuf  [address]  [facility] [address]     Dear Colleague,    Thank you for referring your patient, Hunter Yusuf, to the [facility] [place] at [facility]. Please see a copy of my visit note below.    Hunter is a 40 year old who is being evaluated via a billable telephone visit.      What phone number would you like to be contacted at? [phone]  How would you like to obtain your AVS? Mail a copy     During this virtual visit the patient is located in [place], patient verifies this as the location during the entirety of this visit.     Phone call duration: 15 minutes    Return Bariatric Surgery Note    RE: Hunter Yusuf  MR#: [id number]  : 1980  VISIT DATE: May 5, 2021      Dear Clinic, [facility],    I had the pleasure of seeing your patient, Hunter Yusuf, in my post-bariatric surgery assessment clinic.    Assessment & Plan   Problem List Items Addressed This Visit     S/P biliopancreatic diversion with duodenal switch         S/p DS with low vit A and vit D and iron deficiency    ADEK Bariatric Advantage two once daily  Vit D3 and A [employer] 3000 international unit(s) of each, Taking 5 per day.   No iron orally  Not taking calcium citrate  Did 2 iron infusions in March   Needed repeat labs  BM 2-3 day since surgery  No swelling in extremities  Smoking 1/2 ppd  No vision changes since taking dry vitamin A    Highest wt: 500 lbs  Weight today: 185-190 lbs which is about 10 lbs less than beatrice weight   He reports eating all the time  Not interested in limb lengthening  Eating > grams protein daily    PLAN:  Recheck labs at any [facility] lab to see how iron infusion and restart of fat soluble vitamins have improved vitamin levels. To find a lab location near you, please call [phone].  Follow up 3-6 months or earlier if continuing to lose weight see Dr Lopes    "               15 minutes spent on the date of the encounter doing chart review, history and exam, documentation and further activities per the note      CHIEF COMPLAINT: Post-bariatric surgery follow-up    HISTORY OF PRESENT ILLNESS:  No flowsheet data found.    Weight History:  No flowsheet data found.  Initial Weight (lbs): 502 lbs  Weight: 86.2 kg (190 lb)(pt reported)  Last Visits Weight: 90.5 kg (199 lb 9.6 oz)  Cumulative weight loss (lbs): 312  Weight Loss Percentage: 62.15%    No flowsheet data found.    Eating Habits 2/12/2020   How many meals do you eat per day? 5 or more   Do you snack between meals? Sometimes       Exercise Questions Reviewed With Patient 2/12/2020   How often do you exercise? Less than 1 time per week   What is the duration of your exercise (in minutes)? I do not exercise.   What types of exercise do you do? I don't exercise   What keeps you from being more active?  I am as active as I can possbily be       Social History:  No flowsheet data found.    Medications:  Current Outpatient Medications   Medication     Albuterol Sulfate (PROAIR HFA IN)     amitriptyline (ELAVIL) 150 MG tablet     amitriptyline (ELAVIL) 50 MG tablet     calcium Citrate-vitamin D 500-400 MG-UNIT CHEW     fentaNYL (DURAGESIC) 25 mcg/hr 72 hr patch     HYDROcodone-acetaminophen  MG per tablet     tiZANidine (ZANAFLEX) 4 MG tablet     vitamin A 04760 UNIT capsule     vitamin D3 (CHOLECALCIFEROL) 45195 units (250 mcg) capsule     No current facility-administered medications for this visit.      No flowsheet data found.      PHYSICAL EXAM:  Objective    Ht 1.854 m (6' 1\")   Wt 86.2 kg (190 lb)   BMI 25.07 kg/m    Vitals - Patient Reported  Pain Score: Extreme Pain (8)(back pain)        Physical Exam   healthy, alert and no distress  PSYCH: Alert and oriented times 3; coherent speech, normal   rate and volume, able to articulate logical thoughts, able   to abstract reason, no tangential thoughts, no " hallucinations   or delusions  His affect is normal  RESP: No cough, no audible wheezing, able to talk in full sentences  Remainder of exam unable to be completed due to telephone visits    Sincerely,    Mirtha Alejandra PA-C

## 2021-05-05 NOTE — PROGRESS NOTES
uHnter is a 40 year old who is being evaluated via a billable telephone visit.      What phone number would you like to be contacted at? [phone]  How would you like to obtain your AVS? Mail a copy     During this virtual visit the patient is located in [place], patient verifies this as the location during the entirety of this visit.     Phone call duration: 15 minutes    Return Bariatric Surgery Note    RE: Hunter Yusuf  MR#: [id number]  : 1980  VISIT DATE: May 5, 2021      Dear Clinic, [facility],    I had the pleasure of seeing your patient, Hunter Yusuf, in my post-bariatric surgery assessment clinic.    Assessment & Plan   Problem List Items Addressed This Visit     S/P biliopancreatic diversion with duodenal switch         S/p DS with low vit A and vit D and iron deficiency    ADEK Bariatric Advantage two once daily  Vit D3 and A [employer] 3000 international unit(s) of each, Taking 5 per day.   No iron orally  Not taking calcium citrate  Did 2 iron infusions in March   Needed repeat labs  BM 2-3 day since surgery  No swelling in extremities  Smoking 1/2 ppd  No vision changes since taking dry vitamin A    Highest wt: 500 lbs  Weight today: 185-190 lbs which is about 10 lbs less than beatrice weight   He reports eating all the time  Not interested in limb lengthening  Eating > grams protein daily    PLAN:  Recheck labs at any [facility] lab to see how iron infusion and restart of fat soluble vitamins have improved vitamin levels. To find a lab location near you, please call [phone].  Follow up 3-6 months or earlier if continuing to lose weight see Dr Lopes                  15 minutes spent on the date of the encounter doing chart review, history and exam, documentation and further activities per the note      CHIEF COMPLAINT: Post-bariatric surgery follow-up    HISTORY OF PRESENT ILLNESS:  No flowsheet data found.    Weight History:  No flowsheet data found.  Initial Weight  "(lbs): 502 lbs  Weight: 86.2 kg (190 lb)(pt reported)  Last Visits Weight: 90.5 kg (199 lb 9.6 oz)  Cumulative weight loss (lbs): 312  Weight Loss Percentage: 62.15%    No flowsheet data found.    Eating Habits 2/12/2020   How many meals do you eat per day? 5 or more   Do you snack between meals? Sometimes       Exercise Questions Reviewed With Patient 2/12/2020   How often do you exercise? Less than 1 time per week   What is the duration of your exercise (in minutes)? I do not exercise.   What types of exercise do you do? I don't exercise   What keeps you from being more active?  I am as active as I can possbily be       Social History:  No flowsheet data found.    Medications:  Current Outpatient Medications   Medication     Albuterol Sulfate (PROAIR HFA IN)     amitriptyline (ELAVIL) 150 MG tablet     amitriptyline (ELAVIL) 50 MG tablet     calcium Citrate-vitamin D 500-400 MG-UNIT CHEW     fentaNYL (DURAGESIC) 25 mcg/hr 72 hr patch     HYDROcodone-acetaminophen  MG per tablet     tiZANidine (ZANAFLEX) 4 MG tablet     vitamin A 83680 UNIT capsule     vitamin D3 (CHOLECALCIFEROL) 22298 units (250 mcg) capsule     No current facility-administered medications for this visit.      No flowsheet data found.      PHYSICAL EXAM:  Objective    Ht 1.854 m (6' 1\")   Wt 86.2 kg (190 lb)   BMI 25.07 kg/m    Vitals - Patient Reported  Pain Score: Extreme Pain (8)(back pain)        Physical Exam   healthy, alert and no distress  PSYCH: Alert and oriented times 3; coherent speech, normal   rate and volume, able to articulate logical thoughts, able   to abstract reason, no tangential thoughts, no hallucinations   or delusions  His affect is normal  RESP: No cough, no audible wheezing, able to talk in full sentences  Remainder of exam unable to be completed due to telephone visits        Sincerely,    Mirtha Alejandra PA-C    "